# Patient Record
Sex: FEMALE | Race: WHITE | NOT HISPANIC OR LATINO | Employment: FULL TIME | URBAN - METROPOLITAN AREA
[De-identification: names, ages, dates, MRNs, and addresses within clinical notes are randomized per-mention and may not be internally consistent; named-entity substitution may affect disease eponyms.]

---

## 2018-04-27 ENCOUNTER — APPOINTMENT (OUTPATIENT)
Dept: WOMENS IMAGING | Facility: HOSPITAL | Age: 48
End: 2018-04-27

## 2018-04-27 PROCEDURE — 77066 DX MAMMO INCL CAD BI: CPT | Performed by: RADIOLOGY

## 2018-04-27 PROCEDURE — 77062 BREAST TOMOSYNTHESIS BI: CPT | Performed by: RADIOLOGY

## 2018-04-27 PROCEDURE — 76641 ULTRASOUND BREAST COMPLETE: CPT | Performed by: RADIOLOGY

## 2022-05-12 ENCOUNTER — OFFICE VISIT (OUTPATIENT)
Dept: CARDIOLOGY | Facility: CLINIC | Age: 52
End: 2022-05-12

## 2022-05-12 VITALS
WEIGHT: 293 LBS | HEART RATE: 84 BPM | HEIGHT: 69 IN | BODY MASS INDEX: 43.4 KG/M2 | DIASTOLIC BLOOD PRESSURE: 85 MMHG | SYSTOLIC BLOOD PRESSURE: 140 MMHG | OXYGEN SATURATION: 99 %

## 2022-05-12 DIAGNOSIS — R55 SYNCOPE AND COLLAPSE: Primary | ICD-10-CM

## 2022-05-12 DIAGNOSIS — R06.02 SOB (SHORTNESS OF BREATH): ICD-10-CM

## 2022-05-12 DIAGNOSIS — Z86.711 HISTORY OF PULMONARY EMBOLUS (PE): ICD-10-CM

## 2022-05-12 DIAGNOSIS — R07.89 CHEST PAIN, ATYPICAL: ICD-10-CM

## 2022-05-12 PROCEDURE — 93000 ELECTROCARDIOGRAM COMPLETE: CPT | Performed by: NURSE PRACTITIONER

## 2022-05-12 PROCEDURE — 99204 OFFICE O/P NEW MOD 45 MIN: CPT | Performed by: NURSE PRACTITIONER

## 2022-05-12 RX ORDER — MULTIPLE VITAMINS W/ MINERALS TAB 9MG-400MCG
1 TAB ORAL DAILY
COMMUNITY

## 2022-05-12 RX ORDER — NITROGLYCERIN 0.4 MG/1
TABLET SUBLINGUAL
Qty: 30 TABLET | Refills: 5 | Status: SHIPPED | OUTPATIENT
Start: 2022-05-12

## 2022-05-12 RX ORDER — ASCORBIC ACID 500 MG
500 TABLET ORAL DAILY
COMMUNITY

## 2022-05-12 RX ORDER — FERROUS SULFATE TAB EC 324 MG (65 MG FE EQUIVALENT) 324 (65 FE) MG
324 TABLET DELAYED RESPONSE ORAL
COMMUNITY

## 2022-05-12 RX ORDER — LEVOTHYROXINE SODIUM 0.07 MG/1
75 TABLET ORAL DAILY
COMMUNITY

## 2022-05-12 RX ORDER — LEVOTHYROXINE AND LIOTHYRONINE 9.5; 2.25 UG/1; UG/1
15 TABLET ORAL DAILY
COMMUNITY

## 2022-05-12 RX ORDER — ERGOCALCIFEROL 1.25 MG/1
50000 CAPSULE ORAL WEEKLY
COMMUNITY

## 2022-05-12 NOTE — PROGRESS NOTES
Subjective     Stephanie Clemens is a 52 y.o. female who presents to day for Palpitations, Shortness of Breath, and Pulmonary Embolism.    CHIEF COMPLIANT  Chief Complaint   Patient presents with   • Palpitations   • Shortness of Breath   • Pulmonary Embolism       Active Problems:  Problem List Items Addressed This Visit    None     Visit Diagnoses     Syncope and collapse    -  Primary    Relevant Orders    Adult Transthoracic Echo Complete W/ Cont if Necessary Per Protocol    Stress Test With Myocardial Perfusion One Day    Cardiac Event Monitor    Chest pain, atypical        Relevant Medications    nitroglycerin (NITROSTAT) 0.4 MG SL tablet    Other Relevant Orders    Adult Transthoracic Echo Complete W/ Cont if Necessary Per Protocol    Stress Test With Myocardial Perfusion One Day    Cardiac Event Monitor    SOB (shortness of breath)        Relevant Orders    Adult Transthoracic Echo Complete W/ Cont if Necessary Per Protocol    Stress Test With Myocardial Perfusion One Day    Cardiac Event Monitor    History of pulmonary embolus (PE)        Relevant Orders    Adult Transthoracic Echo Complete W/ Cont if Necessary Per Protocol    Stress Test With Myocardial Perfusion One Day    Cardiac Event Monitor        Problem list  1.  Chest pain  2.  Shortness of breath  3.  History of pulmonary embolism on Xarelto  4.  Syncope  5.  Fatigue  HPI  HPI  Ms. Clemens is a 52-year-old female patient who is being seen today to establish care for history of pulmonary embolism which was diagnosed 4/22/2022.  She is being treated for this by her PCP who is placed her on Xarelto 15 mg 2 times daily for 21 days then 20 mg daily thereafter.  She is waiting on referrals from hematology and pulmonology.  She did have bilateral medium size upper and lower lobe pulmonary emboli with the largest in the right lobe is identified in the right main pulmonary artery.  She is tolerating the Xarelto well.  She is not having any major signs or  "symptoms of bleeding.  She does report having heavier periods.  She does report that she does not feel like she gets a good breath and she has to take deeper breaths.  She does not have a history of clots but she does not identify any potential triggers or provoking factors for her to develop a blood clot.  She also reports chest pain that started around the same time in which she describes as occurring under her left breast into her left armpit which is a sharp shooting pain that short-lived.  She also has pain in her left chest that goes into her left neck and left side of her back that is a \"hurt \"type pain.  This can last up to 15 to 20 minutes per episode.  Occurs both with rest and activity.  She does experience some nausea with these episodes.  She denies any treatments at this time.  She also has shortness of breath in which some days are worse than others.  It is intermittent and varies.  She does get it with activity and at rest.  She says at times minimal activity can cause her to be dyspneic as well as just talking can cause her to become short of breath at other times.  She is not able to go more than 15 minutes with any activity without becoming severely dyspneic.  She also reports fatigue where she is very tired all the time and this has been happening for quite a while.  She is recently went under a home sleep study.  She is awaiting results.  She also reports that she has had syncope x4.  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  She has been temporarily removed from driving the bus.  She says that she did not feel like she was overly sleepy more than usual and that she did not remember falling asleep.  But she jolted back to consciousness.  She denied any convulsions or loss bowel or bladder control.  She had no COVID and she was evaluated for this as well.  She also has a relatively large thyroid nodule on the " right side and another palpable lesion just above that for palpitation.  She is being followed by endocrinology for this and has a repeat scan in the near future.  Her blood pressure is elevated today at 140/85 heart rate of 84.  She had it usually runs about 127/67 at home.  She is not had any trouble with her blood pressure here lately it has been running higher since the PE E.  She does have chronic lower extremity edema which mainly occurs around her ankles.  She also reports palpitations where she has intermittent racing type sensation in her chest with intermittent dizziness and lightheadedness.  PRIOR MEDS  Current Outpatient Medications on File Prior to Visit   Medication Sig Dispense Refill   • ferrous sulfate 324 (65 Fe) MG tablet delayed-release EC tablet Take 324 mg by mouth Daily With Breakfast.     • levothyroxine (SYNTHROID, LEVOTHROID) 75 MCG tablet Take 75 mcg by mouth Daily.     • multivitamin with minerals tablet tablet Take 1 tablet by mouth Daily.     • rivaroxaban (XARELTO) 15 MG tablet Take 15 mg by mouth Daily.     • thyroid (ARMOUR) 15 MG tablet Take 15 mg by mouth Daily.     • vitamin C (ASCORBIC ACID) 500 MG tablet Take 500 mg by mouth Daily.     • vitamin D (ERGOCALCIFEROL) 1.25 MG (06685 UT) capsule capsule Take 50,000 Units by mouth 1 (One) Time Per Week.       No current facility-administered medications on file prior to visit.       ALLERGIES  Patient has no known allergies.    HISTORY  Past Medical History:   Diagnosis Date   • Anemia, unspecified    • Hypothyroidism    • Pulmonary embolism (HCC)    • Right thyroid nodule        Social History     Socioeconomic History   • Marital status:    Tobacco Use   • Smoking status: Never Smoker   • Smokeless tobacco: Never Used   Substance and Sexual Activity   • Alcohol use: Never   • Drug use: Never   • Sexual activity: Defer       Family History   Problem Relation Age of Onset   • No Known Problems Mother    • Heart disease Father   "  • Diabetes Father    • Hypertension Sister    • Anemia Sister    • Breast cancer Maternal Grandmother    • Osteoporosis Maternal Grandmother    • Hip fracture Maternal Grandmother        Review of Systems   Constitutional: Positive for fatigue. Negative for chills and fever.   HENT: Positive for congestion and sore throat. Negative for rhinorrhea.    Respiratory: Positive for shortness of breath. Negative for chest tightness.    Cardiovascular: Positive for chest pain (Left arm Pain Neck Left side Back Left side), palpitations (races) and leg swelling.   Gastrointestinal: Negative for constipation, diarrhea and nausea.   Musculoskeletal: Positive for arthralgias, back pain (degenertivr disc disease) and neck pain.   Allergic/Immunologic: Positive for environmental allergies. Negative for food allergies.   Neurological: Positive for dizziness, syncope, weakness and light-headedness.   Hematological: Bruises/bleeds easily.   Psychiatric/Behavioral: Negative for sleep disturbance.       Objective     VITALS: /85 (BP Location: Left arm, Patient Position: Sitting)   Pulse 84   Ht 175.3 cm (69\")   Wt (!) 151 kg (332 lb)   SpO2 99%   BMI 49.03 kg/m²     LABS:   Lab Results (most recent)     None          IMAGING:   No Images in the past 120 days found..    EXAM:  Physical Exam  Vitals and nursing note reviewed.   Constitutional:       Appearance: She is well-developed.   HENT:      Head: Normocephalic.   Eyes:      Pupils: Pupils are equal, round, and reactive to light.   Neck:      Thyroid: No thyroid mass.      Vascular: No carotid bruit or JVD.      Trachea: Trachea and phonation normal.   Cardiovascular:      Rate and Rhythm: Normal rate and regular rhythm.      Pulses:           Radial pulses are 2+ on the right side and 2+ on the left side.        Posterior tibial pulses are 2+ on the right side and 2+ on the left side.      Heart sounds: Normal heart sounds. No murmur heard.    No friction rub. No " gallop.   Pulmonary:      Effort: Pulmonary effort is normal. No respiratory distress.      Breath sounds: Normal breath sounds. No wheezing or rales.   Abdominal:      General: Bowel sounds are normal.      Palpations: Abdomen is soft.   Musculoskeletal:         General: Swelling (1+ nonpitting) present. Normal range of motion.      Cervical back: Neck supple.   Skin:     General: Skin is warm and dry.      Capillary Refill: Capillary refill takes less than 2 seconds.      Findings: No rash.   Neurological:      Mental Status: She is alert and oriented to person, place, and time.   Psychiatric:         Speech: Speech normal.         Behavior: Behavior normal.         Thought Content: Thought content normal.         Judgment: Judgment normal.         Procedure     ECG 12 Lead    Date/Time: 5/12/2022 11:15 AM  Performed by: Herminio Barraza APRN  Authorized by: Herminio Barraza APRN   Rhythm: sinus rhythm  Rate: normal  BPM: 79  QRS axis: normal  Other findings: non-specific ST-T wave changes  Comments:  ms  No acute changes               Assessment & Plan    Diagnosis Plan   1. Syncope and collapse  Adult Transthoracic Echo Complete W/ Cont if Necessary Per Protocol    Stress Test With Myocardial Perfusion One Day    Cardiac Event Monitor   2. Chest pain, atypical  Adult Transthoracic Echo Complete W/ Cont if Necessary Per Protocol    Stress Test With Myocardial Perfusion One Day    Cardiac Event Monitor    nitroglycerin (NITROSTAT) 0.4 MG SL tablet   3. SOB (shortness of breath)  Adult Transthoracic Echo Complete W/ Cont if Necessary Per Protocol    Stress Test With Myocardial Perfusion One Day    Cardiac Event Monitor   4. History of pulmonary embolus (PE)  Adult Transthoracic Echo Complete W/ Cont if Necessary Per Protocol    Stress Test With Myocardial Perfusion One Day    Cardiac Event Monitor   1.  Due to patient's syncope, chest pain, shortness of breath I do feel its appropriate have patient undergo  an ischemic evaluation including stress test and echocardiogram.  This will help determine the potential causes of her symptoms as well as rule out ischemia as a potential factor.  2.  Patient was prescribed sublingual nitroglycerin and advised he can take up to 3 doses 5 minutes apart and if pain is not relieved after the third dose go to the emergency department.  3.  Due to patient's syncope I also like for her to wear a cardiac event monitor eptotermin etiology of her palpitations and potential causes of her syncope.  She will wear the cardiac event monitor for 14 days.  4.  Informed of signs and symptoms of ACS and advised to seek emergent treatment for any new worsening symptoms.  Patient also advised sooner follow-up as needed.  Also advised to follow-up with family doctor as needed  This note is dictated utilizing voice recognition software.  Although this record has been proof read, transcriptional errors may still be present. If questions occur regarding the content of this record please do not hesitate to call our office.  I have reviewed and confirmed the accuracy of the ROS as documented by the MA/LPN/RN MARLENI Morton    Return in about 3 months (around 8/12/2022), or if symptoms worsen or fail to improve.    Diagnoses and all orders for this visit:    1. Syncope and collapse (Primary)  -     Adult Transthoracic Echo Complete W/ Cont if Necessary Per Protocol; Future  -     Stress Test With Myocardial Perfusion One Day; Future  -     Cardiac Event Monitor; Future    2. Chest pain, atypical  -     Adult Transthoracic Echo Complete W/ Cont if Necessary Per Protocol; Future  -     Stress Test With Myocardial Perfusion One Day; Future  -     Cardiac Event Monitor; Future  -     nitroglycerin (NITROSTAT) 0.4 MG SL tablet; 1 under the tongue as needed for angina, may repeat q5mins for up three doses  Dispense: 30 tablet; Refill: 5    3. SOB (shortness of breath)  -     Adult Transthoracic Echo Complete  W/ Cont if Necessary Per Protocol; Future  -     Stress Test With Myocardial Perfusion One Day; Future  -     Cardiac Event Monitor; Future    4. History of pulmonary embolus (PE)  -     Adult Transthoracic Echo Complete W/ Cont if Necessary Per Protocol; Future  -     Stress Test With Myocardial Perfusion One Day; Future  -     Cardiac Event Monitor; Future        Stephanie DIAS Jayleen  reports that she has never smoked. She has never used smokeless tobacco.. I have educated her on the risk of diseases from using tobacco products .         MEDS ORDERED DURING VISIT:  New Medications Ordered This Visit   Medications   • nitroglycerin (NITROSTAT) 0.4 MG SL tablet     Si under the tongue as needed for angina, may repeat q5mins for up three doses     Dispense:  30 tablet     Refill:  5           This document has been electronically signed by Herminio Barraza Jr., APRN  May 12, 2022 11:13 EDT

## 2022-06-28 ENCOUNTER — HOSPITAL ENCOUNTER (OUTPATIENT)
Dept: CARDIOLOGY | Facility: HOSPITAL | Age: 52
Discharge: HOME | End: 2022-06-28
Admitting: NURSE PRACTITIONER

## 2022-06-28 ENCOUNTER — HOSPITAL ENCOUNTER (OUTPATIENT)
Dept: CARDIOLOGY | Facility: HOSPITAL | Age: 52
Discharge: HOME | End: 2022-06-28

## 2022-06-28 VITALS — WEIGHT: 293 LBS | HEIGHT: 69 IN | BODY MASS INDEX: 43.4 KG/M2

## 2022-06-28 DIAGNOSIS — Z86.711 HISTORY OF PULMONARY EMBOLUS (PE): ICD-10-CM

## 2022-06-28 DIAGNOSIS — R55 SYNCOPE AND COLLAPSE: ICD-10-CM

## 2022-06-28 DIAGNOSIS — R06.02 SOB (SHORTNESS OF BREATH): ICD-10-CM

## 2022-06-28 DIAGNOSIS — R07.89 CHEST PAIN, ATYPICAL: ICD-10-CM

## 2022-06-28 PROCEDURE — 0 TECHNETIUM SESTAMIBI: Performed by: INTERNAL MEDICINE

## 2022-06-28 PROCEDURE — 25010000002 REGADENOSON 0.4 MG/5ML SOLUTION: Performed by: INTERNAL MEDICINE

## 2022-06-28 PROCEDURE — A9500 TC99M SESTAMIBI: HCPCS | Performed by: INTERNAL MEDICINE

## 2022-06-28 PROCEDURE — 93017 CV STRESS TEST TRACING ONLY: CPT

## 2022-06-28 PROCEDURE — 93306 TTE W/DOPPLER COMPLETE: CPT

## 2022-06-28 PROCEDURE — 93306 TTE W/DOPPLER COMPLETE: CPT | Performed by: INTERNAL MEDICINE

## 2022-06-28 PROCEDURE — 78452 HT MUSCLE IMAGE SPECT MULT: CPT

## 2022-06-28 PROCEDURE — 93018 CV STRESS TEST I&R ONLY: CPT | Performed by: INTERNAL MEDICINE

## 2022-06-28 PROCEDURE — 78452 HT MUSCLE IMAGE SPECT MULT: CPT | Performed by: INTERNAL MEDICINE

## 2022-06-28 RX ADMIN — TECHNETIUM TC 99M SESTAMIBI 1 DOSE: 1 INJECTION INTRAVENOUS at 10:06

## 2022-06-28 RX ADMIN — TECHNETIUM TC 99M SESTAMIBI 1 DOSE: 1 INJECTION INTRAVENOUS at 08:27

## 2022-06-28 RX ADMIN — REGADENOSON 0.4 MG: 0.08 INJECTION, SOLUTION INTRAVENOUS at 10:06

## 2022-06-30 LAB
BH CV REST NUCLEAR ISOTOPE DOSE: 10 MCI
BH CV STRESS COMMENTS STAGE 1: NORMAL
BH CV STRESS DOSE REGADENOSON STAGE 1: 0.4
BH CV STRESS DURATION MIN STAGE 1: 0
BH CV STRESS DURATION SEC STAGE 1: 10
BH CV STRESS NUCLEAR ISOTOPE DOSE: 30 MCI
BH CV STRESS PROTOCOL 1: NORMAL
BH CV STRESS RECOVERY BP: NORMAL MMHG
BH CV STRESS RECOVERY HR: 75 BPM
BH CV STRESS STAGE 1: 1
MAXIMAL PREDICTED HEART RATE: 167 BPM
PERCENT MAX PREDICTED HR: 54.49 %
STRESS BASELINE BP: NORMAL MMHG
STRESS BASELINE HR: 67 BPM
STRESS PERCENT HR: 64 %
STRESS POST PEAK BP: NORMAL MMHG
STRESS POST PEAK HR: 91 BPM
STRESS TARGET HR: 142 BPM

## 2022-07-03 LAB
AORTIC DIMENSIONLESS INDEX: 1 (DI)
BH CV ECHO MEAS - ACS: 2 CM
BH CV ECHO MEAS - AO MAX PG: 3.8 MMHG
BH CV ECHO MEAS - AO MEAN PG: 2.2 MMHG
BH CV ECHO MEAS - AO ROOT DIAM: 3.3 CM
BH CV ECHO MEAS - AO V2 MAX: 97.6 CM/SEC
BH CV ECHO MEAS - AO V2 VTI: 23.9 CM
BH CV ECHO MEAS - EDV(CUBED): 178.4 ML
BH CV ECHO MEAS - EF_3D-VOL: 61 %
BH CV ECHO MEAS - ESV(CUBED): 70.8 ML
BH CV ECHO MEAS - FS: 26.5 %
BH CV ECHO MEAS - IVS/LVPW: 1.02 CM
BH CV ECHO MEAS - IVSD: 1.17 CM
BH CV ECHO MEAS - LA DIMENSION: 4.6 CM
BH CV ECHO MEAS - LAT PEAK E' VEL: 9.1 CM/SEC
BH CV ECHO MEAS - LV MASS(C)D: 270.1 GRAMS
BH CV ECHO MEAS - LV MAX PG: 4.1 MMHG
BH CV ECHO MEAS - LV MEAN PG: 1.71 MMHG
BH CV ECHO MEAS - LV V1 MAX: 100.9 CM/SEC
BH CV ECHO MEAS - LV V1 VTI: 25.4 CM
BH CV ECHO MEAS - LVIDD: 5.6 CM
BH CV ECHO MEAS - LVIDS: 4.1 CM
BH CV ECHO MEAS - LVPWD: 1.15 CM
BH CV ECHO MEAS - MED PEAK E' VEL: 7.2 CM/SEC
BH CV ECHO MEAS - MV A MAX VEL: 86.9 CM/SEC
BH CV ECHO MEAS - MV DEC SLOPE: 511.2 CM/SEC2
BH CV ECHO MEAS - MV DEC TIME: 0.2 MSEC
BH CV ECHO MEAS - MV E MAX VEL: 80.2 CM/SEC
BH CV ECHO MEAS - MV E/A: 0.92
BH CV ECHO MEAS - MV MAX PG: 3.3 MMHG
BH CV ECHO MEAS - MV MEAN PG: 1.47 MMHG
BH CV ECHO MEAS - MV P1/2T: 52.6 MSEC
BH CV ECHO MEAS - MV V2 VTI: 27.7 CM
BH CV ECHO MEAS - MVA(P1/2T): 4.2 CM2
BH CV ECHO MEAS - PA V2 MAX: 87.9 CM/SEC
BH CV ECHO MEAS - RV MAX PG: 2.6 MMHG
BH CV ECHO MEAS - RV V1 MAX: 80.2 CM/SEC
BH CV ECHO MEAS - RV V1 VTI: 25.3 CM
BH CV ECHO MEAS - RVDD: 2.8 CM
BH CV ECHO MEAS - TAPSE (>1.6): 2.37 CM
BH CV ECHO MEASUREMENTS AVERAGE E/E' RATIO: 9.84
BH CV XLRA - TDI S': 12.8 CM/SEC
IVRT: 121 MSEC
MAXIMAL PREDICTED HEART RATE: 167 BPM
SINUS: 3.3 CM
STJ: 2.7 CM
STRESS TARGET HR: 142 BPM

## 2022-07-05 ENCOUNTER — TELEPHONE (OUTPATIENT)
Dept: CARDIOLOGY | Facility: CLINIC | Age: 52
End: 2022-07-05

## 2022-07-05 NOTE — TELEPHONE ENCOUNTER
STRESS/ECHO  Pt notified of no acute findings. Provider will discuss results at f/u. Pt reminded of appt date and time.  ----- Message from Naomi Burdick MA sent at 7/1/2022 11:32 AM EDT -----  Regarding: FW:    ----- Message -----  From: Herminio Barraza APRN  Sent: 7/1/2022   8:19 AM EDT  To: Naomi Burdick MA  Subject: FW:                                              Normal stress test keep follow up  ----- Message -----  From: Arpit Orr MD  Sent: 6/30/2022  10:11 PM EDT  To: MARLENI Morton

## 2024-03-07 ENCOUNTER — TELEPHONE (OUTPATIENT)
Dept: CARDIOLOGY | Facility: CLINIC | Age: 54
End: 2024-03-07
Payer: MEDICAID

## 2024-03-07 DIAGNOSIS — R00.2 PALPITATIONS: Primary | ICD-10-CM

## 2024-03-07 NOTE — TELEPHONE ENCOUNTER
Caller: Stephanie Clemens    Relationship to patient: Self    Best call back number: [phone]    Chief complaint: PT SAW HER PCP AND PCP STATED SHE NEEDED TO GET IN TO BE SEEN AND HAVE A MONITOR PLACED ON HER. PT HAS BEEN HAVING LIGHTHEADEDNESS AND DIZZINESS, BP IS READING HIGH TO LOW AND AN ABNORMAL HEART RATE FOR HER BP MACHINE.     Type of visit: FOLLOW UP    Requested date: NEEDS TO BE IN APRIL, HAVING PROCEDURE PRIOR TO THAT.     Additional notes: WAS LAST SEEN IN THE MIDDLE OF 2022 AND IS REQUESTING TO SEE LAUREANO EVEN THOUGH SHE HAS NOT SEEN HIM PREVIOUSLY.

## 2024-03-07 NOTE — TELEPHONE ENCOUNTER
Per Isaac Cunha pac 72 hour holter monitor, order placed and patient notified. Patient reports is having hysterectomy next Thursday, instructed patient to come in early in the morning for monitor placement and place monitor in mail as soon as she takes it off so we can get result's back prior to her surgery. Patient verbalizes understanding. Order placed.

## 2024-03-13 ENCOUNTER — OFFICE VISIT (OUTPATIENT)
Dept: CARDIOLOGY | Facility: CLINIC | Age: 54
End: 2024-03-13
Payer: MEDICAID

## 2024-03-13 VITALS
HEART RATE: 70 BPM | SYSTOLIC BLOOD PRESSURE: 139 MMHG | OXYGEN SATURATION: 96 % | BODY MASS INDEX: 44.41 KG/M2 | HEIGHT: 68 IN | WEIGHT: 293 LBS | DIASTOLIC BLOOD PRESSURE: 81 MMHG

## 2024-03-13 DIAGNOSIS — I10 PRIMARY HYPERTENSION: ICD-10-CM

## 2024-03-13 DIAGNOSIS — R07.2 PRECORDIAL PAIN: ICD-10-CM

## 2024-03-13 DIAGNOSIS — R06.02 SOB (SHORTNESS OF BREATH): Primary | ICD-10-CM

## 2024-03-13 PROCEDURE — 1160F RVW MEDS BY RX/DR IN RCRD: CPT | Performed by: PHYSICIAN ASSISTANT

## 2024-03-13 PROCEDURE — 93000 ELECTROCARDIOGRAM COMPLETE: CPT | Performed by: PHYSICIAN ASSISTANT

## 2024-03-13 PROCEDURE — 1159F MED LIST DOCD IN RCRD: CPT | Performed by: PHYSICIAN ASSISTANT

## 2024-03-13 PROCEDURE — 99214 OFFICE O/P EST MOD 30 MIN: CPT | Performed by: PHYSICIAN ASSISTANT

## 2024-03-13 RX ORDER — BUSPIRONE HYDROCHLORIDE 5 MG/1
5 TABLET ORAL 3 TIMES DAILY
COMMUNITY

## 2024-03-13 RX ORDER — EPINEPHRINE 0.3 MG/.3ML
0.3 INJECTION SUBCUTANEOUS ONCE
COMMUNITY

## 2024-03-13 RX ORDER — HYDROCHLOROTHIAZIDE 12.5 MG/1
12.5 TABLET ORAL DAILY
COMMUNITY

## 2024-03-13 RX ORDER — ROSUVASTATIN CALCIUM 5 MG/1
5 TABLET, COATED ORAL DAILY
COMMUNITY

## 2024-03-13 RX ORDER — IBUPROFEN 800 MG/1
800 TABLET ORAL EVERY 8 HOURS PRN
COMMUNITY
Start: 2023-10-02

## 2024-03-13 RX ORDER — HYDROXYZINE HYDROCHLORIDE 25 MG/1
25 TABLET, FILM COATED ORAL EVERY 6 HOURS PRN
COMMUNITY
Start: 2024-02-22

## 2024-03-13 RX ORDER — MONTELUKAST SODIUM 10 MG/1
10 TABLET ORAL NIGHTLY
COMMUNITY

## 2024-03-13 RX ORDER — LEVOTHYROXINE SODIUM 0.1 MG/1
100 TABLET ORAL DAILY
COMMUNITY

## 2024-03-13 NOTE — PROGRESS NOTES
Problem list     Subjective   Stephanie Clemens is a 53 y.o. female     Chief Complaint   Patient presents with    Follow-up     Cardiac clearance patient scheduled for hysterectomy 3/14/24 with Dr. Velazquez.     Chest Pain    Shortness of Breath     Patient reports shortness of breath states does not know if it is related to panic attacks or not, but does have panic attacks often.       HPI  The patient presents in the clinic today for preoperative cardiac clearance.  She was last seen almost 2 years ago following recurrent symptoms related to pulmonary embolism.  She had a pulmonary embolism diagnosed and was treated with appropriate protocol of Xarelto.  She had done well up until a few months ago.  She started having some degree of smothering.  She eventually was noted to be iron deficient anemic.  She was treated accordingly and her symptoms then resolved.  She had been seen for recurrent issues with her gynecology team.  Hysterectomy was planned for tomorrow.  She saw her primary care provider recently and reported recurrence of shortness of air and even now episodes of chest tightness.  Because of concern of family history, she was referred back to the clinic today urgently for consideration of cardiac clearance.  The patient is very uncomfortable with her symptoms.  When she tries to exert, she becomes dyspneic.  She develops chest aching and tightness.  Symptoms do limit activity.  She still feels it could be a component of anxiety and even possibly mild anemia contributing to symptoms.  She is very concerned and expresses a significant fear of cardiac complication during anesthesia.  She would like to pursue noninvasive cardiac testing prior to any type of surgical procedures.  She presents today to discuss this.    Current Outpatient Medications on File Prior to Visit   Medication Sig Dispense Refill    busPIRone (BUSPAR) 5 MG tablet Take 1 tablet by mouth 3 (Three) Times a Day.      EPINEPHrine (EPIPEN)  0.3 MG/0.3ML solution auto-injector injection Inject 0.3 mL under the skin into the appropriate area as directed 1 (One) Time.      hydroCHLOROthiazide 12.5 MG tablet Take 1 tablet by mouth Daily.      hydrOXYzine (ATARAX) 25 MG tablet Take 1 tablet by mouth Every 6 (Six) Hours As Needed.      ibuprofen (ADVIL,MOTRIN) 800 MG tablet Take 1 tablet by mouth Every 8 (Eight) Hours As Needed.      levothyroxine (SYNTHROID, LEVOTHROID) 100 MCG tablet Take 1 tablet by mouth Daily.      montelukast (SINGULAIR) 10 MG tablet Take 1 tablet by mouth Every Night.      nitroglycerin (NITROSTAT) 0.4 MG SL tablet 1 under the tongue as needed for angina, may repeat q5mins for up three doses 30 tablet 5    rosuvastatin (CRESTOR) 5 MG tablet Take 1 tablet by mouth Daily.      sertraline (ZOLOFT) 50 MG tablet Take 1 tablet by mouth Daily.      vitamin D (ERGOCALCIFEROL) 1.25 MG (45124 UT) capsule capsule Take 1 capsule by mouth 1 (One) Time Per Week.      [DISCONTINUED] ferrous sulfate 324 (65 Fe) MG tablet delayed-release EC tablet Take 324 mg by mouth Daily With Breakfast.      [DISCONTINUED] levothyroxine (SYNTHROID, LEVOTHROID) 75 MCG tablet Take 75 mcg by mouth Daily.      [DISCONTINUED] multivitamin with minerals tablet tablet Take 1 tablet by mouth Daily.      [DISCONTINUED] rivaroxaban (XARELTO) 15 MG tablet Take 15 mg by mouth Daily.      [DISCONTINUED] thyroid (ARMOUR) 15 MG tablet Take 15 mg by mouth Daily.      [DISCONTINUED] vitamin C (ASCORBIC ACID) 500 MG tablet Take 500 mg by mouth Daily.       No current facility-administered medications on file prior to visit.       Bee venom    Past Medical History:   Diagnosis Date    Anemia, unspecified     Hypothyroidism     Pulmonary embolism     Right thyroid nodule        Social History     Socioeconomic History    Marital status:    Tobacco Use    Smoking status: Never    Smokeless tobacco: Never   Substance and Sexual Activity    Alcohol use: Never    Drug use: Never     "Sexual activity: Defer       Family History   Problem Relation Age of Onset    No Known Problems Mother     Heart disease Father     Diabetes Father     Hypertension Sister     Anemia Sister     Breast cancer Maternal Grandmother     Osteoporosis Maternal Grandmother     Hip fracture Maternal Grandmother        Review of Systems   Constitutional:  Positive for chills and fatigue. Negative for diaphoresis and fever.   HENT: Negative.     Eyes: Negative.  Negative for visual disturbance.   Respiratory:  Positive for apnea (does not wear c-pap) and shortness of breath. Negative for cough, chest tightness and wheezing.    Cardiovascular:  Positive for chest pain and leg swelling (feet). Negative for palpitations.   Gastrointestinal: Negative.  Negative for abdominal pain and blood in stool.   Endocrine: Negative.    Genitourinary: Negative.  Negative for hematuria.   Musculoskeletal:  Positive for back pain. Negative for arthralgias, myalgias, neck pain and neck stiffness.   Skin: Negative.  Negative for rash and wound.   Allergic/Immunologic: Positive for environmental allergies (seasonal). Negative for food allergies.   Neurological:  Positive for weakness, light-headedness and numbness (hands and feet). Negative for dizziness, syncope and headaches.   Hematological:  Bruises/bleeds easily (bruises easily).   Psychiatric/Behavioral:  Positive for sleep disturbance (sleep apnea).        Objective   Vitals:    03/13/24 0913   BP: 139/81   Pulse: 70   SpO2: 96%   Weight: (!) 140 kg (308 lb)   Height: 172.7 cm (68\")      /81   Pulse 70   Ht 172.7 cm (68\")   Wt (!) 140 kg (308 lb)   SpO2 96%   BMI 46.83 kg/m²    Lab Results (most recent)       None          Physical Exam  Vitals and nursing note reviewed.   Constitutional:       General: She is not in acute distress.     Appearance: She is well-developed.   HENT:      Head: Normocephalic and atraumatic.   Eyes:      Conjunctiva/sclera: Conjunctivae normal.      " Pupils: Pupils are equal, round, and reactive to light.   Neck:      Vascular: No JVD.      Trachea: No tracheal deviation.   Cardiovascular:      Rate and Rhythm: Normal rate and regular rhythm.      Heart sounds: Normal heart sounds.   Pulmonary:      Effort: Pulmonary effort is normal.      Breath sounds: Normal breath sounds.   Abdominal:      General: Bowel sounds are normal. There is no distension.      Palpations: Abdomen is soft. There is no mass.      Tenderness: There is no abdominal tenderness. There is no guarding or rebound.   Musculoskeletal:         General: No tenderness or deformity. Normal range of motion.      Cervical back: Normal range of motion and neck supple.   Skin:     General: Skin is warm and dry.      Coloration: Skin is not pale.      Findings: No erythema or rash.   Neurological:      Mental Status: She is alert and oriented to person, place, and time.   Psychiatric:         Behavior: Behavior normal.         Thought Content: Thought content normal.         Judgment: Judgment normal.           Procedure     ECG 12 Lead    Date/Time: 3/13/2024 9:18 AM  Performed by: Isaac Cunha PA    Authorized by: Isaac Cunha PA  Comparison: compared with previous ECG from 5/12/2022             Assessment & Plan      Diagnosis Plan   1. SOB (shortness of breath)  Adult Transthoracic Echo Complete W/ Cont if Necessary Per Protocol    Stress Test With Myocardial Perfusion One Day      2. Precordial pain  Adult Transthoracic Echo Complete W/ Cont if Necessary Per Protocol    Stress Test With Myocardial Perfusion One Day      3. Primary hypertension  Adult Transthoracic Echo Complete W/ Cont if Necessary Per Protocol    Stress Test With Myocardial Perfusion One Day        1.  The patient presents in for preoperative cardiac clearance.  She is very concerned dyspnea and chest discomfort as of recent.  She would like to pursue cardiac evaluation prior to anesthesia and surgical procedures.    2.   I would schedule for nuclear stress test for ischemia assessment.  She cannot tolerate treadmill protocol and will be scheduled for Lexiscan protocol.    3.  I would also schedule for an echo.  We can evaluate LV size and function, valvular morphologies, and cardiac structure otherwise.    4.  For now we will continue medications without change.    5.  We have asked that the stress and the echo studies to be performed soon as possible as the patient is pending surgical procedures.  We have asked that her procedure tomorrow for hysterectomy be delayed until we can perform testing.                 Electronically signed by:

## 2024-03-14 ENCOUNTER — TELEPHONE (OUTPATIENT)
Dept: CARDIOLOGY | Facility: CLINIC | Age: 54
End: 2024-03-14
Payer: MEDICAID

## 2024-03-14 NOTE — TELEPHONE ENCOUNTER
Received cardiac clearance request from Dr. Raiza Velazquez stating pt has robotic TLH/BSO scheduled for 03/14/2024 and is requiring a cardiac clearance. Placed cardiac clearance request in Chiara' inbox to review and address with provider.

## 2024-03-29 ENCOUNTER — HOSPITAL ENCOUNTER (OUTPATIENT)
Dept: CARDIOLOGY | Facility: HOSPITAL | Age: 54
Discharge: HOME | End: 2024-03-29
Payer: MEDICAID

## 2024-03-29 VITALS — BODY MASS INDEX: 44.41 KG/M2 | WEIGHT: 293 LBS | HEIGHT: 68 IN

## 2024-03-29 DIAGNOSIS — R07.2 PRECORDIAL PAIN: ICD-10-CM

## 2024-03-29 DIAGNOSIS — R06.02 SOB (SHORTNESS OF BREATH): ICD-10-CM

## 2024-03-29 DIAGNOSIS — I10 PRIMARY HYPERTENSION: ICD-10-CM

## 2024-03-29 LAB
AORTIC DIMENSIONLESS INDEX: 1 (DI)
BH CV ECHO MEAS - AO MAX PG: 3.6 MMHG
BH CV ECHO MEAS - AO MEAN PG: 2.23 MMHG
BH CV ECHO MEAS - AO ROOT DIAM: 2.8 CM
BH CV ECHO MEAS - AO V2 MAX: 94.7 CM/SEC
BH CV ECHO MEAS - AO V2 VTI: 23.3 CM
BH CV ECHO MEAS - EDV(CUBED): 107.1 ML
BH CV ECHO MEAS - EF_3D-VOL: 60 %
BH CV ECHO MEAS - ESV(CUBED): 35.9 ML
BH CV ECHO MEAS - FS: 30.5 %
BH CV ECHO MEAS - IVS/LVPW: 0.96 CM
BH CV ECHO MEAS - IVSD: 1.08 CM
BH CV ECHO MEAS - LA DIMENSION: 3.9 CM
BH CV ECHO MEAS - LAT PEAK E' VEL: 8.4 CM/SEC
BH CV ECHO MEAS - LV MASS(C)D: 190.6 GRAMS
BH CV ECHO MEAS - LV MAX PG: 3.9 MMHG
BH CV ECHO MEAS - LV MEAN PG: 1.68 MMHG
BH CV ECHO MEAS - LV V1 MAX: 98.8 CM/SEC
BH CV ECHO MEAS - LV V1 VTI: 23.9 CM
BH CV ECHO MEAS - LVIDD: 4.7 CM
BH CV ECHO MEAS - LVIDS: 3.3 CM
BH CV ECHO MEAS - LVPWD: 1.12 CM
BH CV ECHO MEAS - MED PEAK E' VEL: 5.9 CM/SEC
BH CV ECHO MEAS - MV A MAX VEL: 65.3 CM/SEC
BH CV ECHO MEAS - MV DEC SLOPE: 509.1 CM/SEC2
BH CV ECHO MEAS - MV DEC TIME: 0.25 SEC
BH CV ECHO MEAS - MV E MAX VEL: 83.2 CM/SEC
BH CV ECHO MEAS - MV E/A: 1.28
BH CV ECHO MEAS - MV MAX PG: 3.6 MMHG
BH CV ECHO MEAS - MV MEAN PG: 1.33 MMHG
BH CV ECHO MEAS - MV P1/2T: 55 MSEC
BH CV ECHO MEAS - MV V2 VTI: 23.3 CM
BH CV ECHO MEAS - MVA(P1/2T): 4 CM2
BH CV ECHO MEAS - PA V2 MAX: 83.1 CM/SEC
BH CV ECHO MEAS - RV MAX PG: 2.34 MMHG
BH CV ECHO MEAS - RV V1 MAX: 76.5 CM/SEC
BH CV ECHO MEAS - RV V1 VTI: 16.8 CM
BH CV ECHO MEAS - TAPSE (>1.6): 2.9 CM
BH CV ECHO MEASUREMENTS AVERAGE E/E' RATIO: 11.64
BH CV REST NUCLEAR ISOTOPE DOSE: 10 MCI
BH CV STRESS COMMENTS STAGE 1: NORMAL
BH CV STRESS DOSE REGADENOSON STAGE 1: 0.4
BH CV STRESS DURATION MIN STAGE 1: 0
BH CV STRESS DURATION SEC STAGE 1: 10
BH CV STRESS NUCLEAR ISOTOPE DOSE: 30 MCI
BH CV STRESS PROTOCOL 1: NORMAL
BH CV STRESS RECOVERY BP: NORMAL MMHG
BH CV STRESS RECOVERY HR: 72 BPM
BH CV STRESS STAGE 1: 1
BH CV XLRA - TDI S': 12 CM/SEC
MAXIMAL PREDICTED HEART RATE: 166 BPM
PERCENT MAX PREDICTED HR: 54.22 %
SINUS: 3.2 CM
STRESS BASELINE BP: NORMAL MMHG
STRESS BASELINE HR: 66 BPM
STRESS PERCENT HR: 64 %
STRESS POST PEAK BP: NORMAL MMHG
STRESS POST PEAK HR: 90 BPM
STRESS TARGET HR: 141 BPM

## 2024-03-29 PROCEDURE — 25010000002 REGADENOSON 0.4 MG/5ML SOLUTION: Performed by: INTERNAL MEDICINE

## 2024-03-29 PROCEDURE — A9500 TC99M SESTAMIBI: HCPCS | Performed by: INTERNAL MEDICINE

## 2024-03-29 PROCEDURE — 93017 CV STRESS TEST TRACING ONLY: CPT

## 2024-03-29 PROCEDURE — 0 TECHNETIUM SESTAMIBI: Performed by: INTERNAL MEDICINE

## 2024-03-29 PROCEDURE — 78452 HT MUSCLE IMAGE SPECT MULT: CPT

## 2024-03-29 PROCEDURE — 93306 TTE W/DOPPLER COMPLETE: CPT

## 2024-03-29 RX ORDER — REGADENOSON 0.08 MG/ML
0.4 INJECTION, SOLUTION INTRAVENOUS
Status: COMPLETED | OUTPATIENT
Start: 2024-03-29 | End: 2024-03-29

## 2024-03-29 RX ADMIN — TECHNETIUM TC 99M SESTAMIBI 1 DOSE: 1 INJECTION INTRAVENOUS at 10:32

## 2024-03-29 RX ADMIN — TECHNETIUM TC 99M SESTAMIBI 1 DOSE: 1 INJECTION INTRAVENOUS at 08:30

## 2024-03-29 RX ADMIN — REGADENOSON 0.4 MG: 0.08 INJECTION, SOLUTION INTRAVENOUS at 10:32

## 2024-04-03 LAB
BH CV REST NUCLEAR ISOTOPE DOSE: 10 MCI
BH CV STRESS COMMENTS STAGE 1: NORMAL
BH CV STRESS DOSE REGADENOSON STAGE 1: 0.4
BH CV STRESS DURATION MIN STAGE 1: 0
BH CV STRESS DURATION SEC STAGE 1: 10
BH CV STRESS NUCLEAR ISOTOPE DOSE: 30 MCI
BH CV STRESS PROTOCOL 1: NORMAL
BH CV STRESS RECOVERY BP: NORMAL MMHG
BH CV STRESS RECOVERY HR: 72 BPM
BH CV STRESS STAGE 1: 1
MAXIMAL PREDICTED HEART RATE: 166 BPM
PERCENT MAX PREDICTED HR: 54.22 %
STRESS BASELINE BP: NORMAL MMHG
STRESS BASELINE HR: 66 BPM
STRESS PERCENT HR: 64 %
STRESS POST PEAK BP: NORMAL MMHG
STRESS POST PEAK HR: 90 BPM
STRESS TARGET HR: 141 BPM

## 2024-04-04 ENCOUNTER — TELEPHONE (OUTPATIENT)
Dept: CARDIOLOGY | Facility: CLINIC | Age: 54
End: 2024-04-04
Payer: MEDICAID

## 2024-04-04 NOTE — TELEPHONE ENCOUNTER
Patient notified of result's and recommendation's for 1 to 2 week follow up. She is aware that some one from our office will contact her with appt date and time.

## 2024-04-04 NOTE — TELEPHONE ENCOUNTER
----- Message from AMADA Villareal sent at 4/4/2024  2:31 PM EDT -----  Follow-up 1 to 2 weeks.  ----- Message -----  From: Arpit Orr MD  Sent: 4/3/2024   9:30 PM EDT  To: AMADA Villareal    Stress Test With Myocardial Perfusion One Day  Order: [id number]  Status: Final result       Visible to patient: Yes (seen)       Dx: Precordial pain; Primary hypertension...    0 Result Notes  Details    Reading Physician Reading Date Result Priority   Arpit Orr MD  [phone] 4/3/2024 Routine     Result Text  1.  Scintigraphy demonstrates a moderately sized, moderately dense nearly completely reversible inferolateral wall defect compatible with ischemia.  Additionally, there is a small to moderately sized, moderately dense reversible defect involving the apical and inferoapical segments also felt compatible with ischemia.     2.  Preserved post stress ejection fraction of 63% with no focal wall motion abnormalities.     3.  Normal transient ischemic dilation and lung heart radiopharmaceutical ratios of 1.03 and 0.05 argue against multivessel coronary disease and increased LV filling pressures respectively.

## 2024-04-07 LAB
AORTIC DIMENSIONLESS INDEX: 1 (DI)
BH CV ECHO MEAS - AO MAX PG: 3.6 MMHG
BH CV ECHO MEAS - AO MEAN PG: 2.23 MMHG
BH CV ECHO MEAS - AO ROOT DIAM: 2.8 CM
BH CV ECHO MEAS - AO V2 MAX: 94.7 CM/SEC
BH CV ECHO MEAS - AO V2 VTI: 23.3 CM
BH CV ECHO MEAS - EDV(CUBED): 107.1 ML
BH CV ECHO MEAS - EF_3D-VOL: 60 %
BH CV ECHO MEAS - ESV(CUBED): 35.9 ML
BH CV ECHO MEAS - FS: 30.5 %
BH CV ECHO MEAS - IVS/LVPW: 0.96 CM
BH CV ECHO MEAS - IVSD: 1.08 CM
BH CV ECHO MEAS - LA DIMENSION: 3.9 CM
BH CV ECHO MEAS - LAT PEAK E' VEL: 8.4 CM/SEC
BH CV ECHO MEAS - LV MASS(C)D: 190.6 GRAMS
BH CV ECHO MEAS - LV MAX PG: 3.9 MMHG
BH CV ECHO MEAS - LV MEAN PG: 1.68 MMHG
BH CV ECHO MEAS - LV V1 MAX: 98.8 CM/SEC
BH CV ECHO MEAS - LV V1 VTI: 23.9 CM
BH CV ECHO MEAS - LVIDD: 4.7 CM
BH CV ECHO MEAS - LVIDS: 3.3 CM
BH CV ECHO MEAS - LVPWD: 1.12 CM
BH CV ECHO MEAS - MED PEAK E' VEL: 5.9 CM/SEC
BH CV ECHO MEAS - MV A MAX VEL: 65.3 CM/SEC
BH CV ECHO MEAS - MV DEC SLOPE: 509.1 CM/SEC2
BH CV ECHO MEAS - MV DEC TIME: 0.25 SEC
BH CV ECHO MEAS - MV E MAX VEL: 83.2 CM/SEC
BH CV ECHO MEAS - MV E/A: 1.28
BH CV ECHO MEAS - MV MAX PG: 3.6 MMHG
BH CV ECHO MEAS - MV MEAN PG: 1.33 MMHG
BH CV ECHO MEAS - MV P1/2T: 55 MSEC
BH CV ECHO MEAS - MV V2 VTI: 23.3 CM
BH CV ECHO MEAS - MVA(P1/2T): 4 CM2
BH CV ECHO MEAS - PA V2 MAX: 83.1 CM/SEC
BH CV ECHO MEAS - RV MAX PG: 2.34 MMHG
BH CV ECHO MEAS - RV V1 MAX: 76.5 CM/SEC
BH CV ECHO MEAS - RV V1 VTI: 16.8 CM
BH CV ECHO MEAS - TAPSE (>1.6): 2.9 CM
BH CV ECHO MEASUREMENTS AVERAGE E/E' RATIO: 11.64
BH CV XLRA - TDI S': 12 CM/SEC
SINUS: 3.2 CM

## 2024-04-16 ENCOUNTER — TELEPHONE (OUTPATIENT)
Dept: CARDIOLOGY | Facility: CLINIC | Age: 54
End: 2024-04-16
Payer: MEDICAID

## 2024-04-16 ENCOUNTER — OFFICE VISIT (OUTPATIENT)
Dept: CARDIOLOGY | Facility: CLINIC | Age: 54
End: 2024-04-16
Payer: MEDICAID

## 2024-04-16 VITALS
DIASTOLIC BLOOD PRESSURE: 73 MMHG | SYSTOLIC BLOOD PRESSURE: 132 MMHG | BODY MASS INDEX: 43.4 KG/M2 | WEIGHT: 293 LBS | OXYGEN SATURATION: 95 % | HEART RATE: 82 BPM | HEIGHT: 69 IN

## 2024-04-16 DIAGNOSIS — R94.39 ABNORMAL NUCLEAR STRESS TEST: ICD-10-CM

## 2024-04-16 DIAGNOSIS — R07.2 PRECORDIAL PAIN: ICD-10-CM

## 2024-04-16 DIAGNOSIS — R06.02 SOB (SHORTNESS OF BREATH): Primary | ICD-10-CM

## 2024-04-16 PROCEDURE — 99214 OFFICE O/P EST MOD 30 MIN: CPT | Performed by: PHYSICIAN ASSISTANT

## 2024-04-16 PROCEDURE — 1159F MED LIST DOCD IN RCRD: CPT | Performed by: PHYSICIAN ASSISTANT

## 2024-04-16 PROCEDURE — 1160F RVW MEDS BY RX/DR IN RCRD: CPT | Performed by: PHYSICIAN ASSISTANT

## 2024-04-16 RX ORDER — ASPIRIN 81 MG/1
81 TABLET ORAL DAILY
COMMUNITY

## 2024-04-16 RX ORDER — SEMAGLUTIDE 1.34 MG/ML
1 INJECTION, SOLUTION SUBCUTANEOUS WEEKLY
COMMUNITY
Start: 2024-04-11

## 2024-04-16 RX ORDER — NITROGLYCERIN 0.4 MG/1
TABLET SUBLINGUAL
Qty: 25 TABLET | Refills: 2 | Status: SHIPPED | OUTPATIENT
Start: 2024-04-16 | End: 2024-04-17 | Stop reason: SDUPTHER

## 2024-04-16 RX ORDER — BUPROPION HYDROCHLORIDE 150 MG/1
150 TABLET ORAL EVERY MORNING
COMMUNITY
Start: 2024-04-11

## 2024-04-16 NOTE — TELEPHONE ENCOUNTER
Pt notified of no acute findings. Provider will discuss results at f/u. Pt reminded of appt date and time.

## 2024-04-16 NOTE — PROGRESS NOTES
Problem list     Subjective   Stephanie Clemens is a 53 y.o. female     Chief Complaint   Patient presents with    Follow-up     Abnormal stress test    Chest Pain    Shortness of Breath    Palpitations       HPI  The patient presents back to review stress and echo findings.  History includes pulmonary embolism at least 2 to 3 years ago.  This was treated appropriately with Xarelto per protocol.  The patient had done well up until recently.  She started having increasing dyspnea and chest tightness.  She eventually was noted to be iron deficient with fairly advanced anemia.  She was treated and initially her symptoms improved.  Again, she was noted to have recurrent anemic issues, and eventually increasing symptoms otherwise.  She was evaluated by her gynecology team and advised that hysterectomy was planned.  We had seen her in the preoperative setting because of ongoing symptoms.  She was scheduled for noninvasive testing.  Stress test was performed and suggested inferolateral and inferoapical ischemia.  Post stress EF was 63%.  Echocardiogram supported normal systolic function with no significant valvular nor structural abnormalities.  She eventually did have a Holter monitor which indicated sinus rhythm, rare PACs and PVCs, and short episodes of SVT.  No significant nor sustained dysrhythmic activity was noted.  The patient still does not feel well.  She has limiting dyspnea.  She then notes chest tightness with increasing levels of exertion.  Symptoms are persistent and concerning for the patient.  She would like to pursue further evaluation.  She denies currently failure nor significant dysrhythmic symptoms.    Current Outpatient Medications on File Prior to Visit   Medication Sig Dispense Refill    aspirin 81 MG EC tablet Take 1 tablet by mouth Daily.      buPROPion XL (WELLBUTRIN XL) 150 MG 24 hr tablet Take 1 tablet by mouth Every Morning.      busPIRone (BUSPAR) 5 MG tablet Take 1 tablet by mouth 3 (Three)  Times a Day.      EPINEPHrine (EPIPEN) 0.3 MG/0.3ML solution auto-injector injection Inject 0.3 mL under the skin into the appropriate area as directed 1 (One) Time.      hydroCHLOROthiazide 12.5 MG tablet Take 1 tablet by mouth Daily.      hydrOXYzine (ATARAX) 25 MG tablet Take 1 tablet by mouth Every 6 (Six) Hours As Needed.      ibuprofen (ADVIL,MOTRIN) 800 MG tablet Take 1 tablet by mouth Every 8 (Eight) Hours As Needed.      levothyroxine (SYNTHROID, LEVOTHROID) 100 MCG tablet Take 1 tablet by mouth Daily.      montelukast (SINGULAIR) 10 MG tablet Take 1 tablet by mouth Every Night.      Ozempic, 1 MG/DOSE, 4 MG/3ML solution pen-injector Inject 1 mg under the skin into the appropriate area as directed 1 (One) Time Per Week.      rosuvastatin (CRESTOR) 5 MG tablet Take 1 tablet by mouth Daily. Patient takes only on mon, wed and fri      sertraline (ZOLOFT) 50 MG tablet Take 1 tablet by mouth Daily.      vitamin D (ERGOCALCIFEROL) 1.25 MG (65627 UT) capsule capsule Take 1 capsule by mouth 1 (One) Time Per Week.      [DISCONTINUED] nitroglycerin (NITROSTAT) 0.4 MG SL tablet 1 under the tongue as needed for angina, may repeat q5mins for up three doses 30 tablet 5     No current facility-administered medications on file prior to visit.       Bee venom and Procaine    Past Medical History:   Diagnosis Date    Anemia, unspecified     Hypothyroidism     Pulmonary embolism     Right thyroid nodule     Sleep apnea        Social History     Socioeconomic History    Marital status:    Tobacco Use    Smoking status: Never    Smokeless tobacco: Never   Substance and Sexual Activity    Alcohol use: Never    Drug use: Never    Sexual activity: Not Currently     Partners: Male     Birth control/protection: Tubal ligation     Comment: Uterine ablasion performed but failed. Hysterectomy  planned       Family History   Problem Relation Age of Onset    No Known Problems Mother     Heart disease Father     Diabetes Father      "Hypertension Sister     Anemia Sister     Breast cancer Maternal Grandmother     Osteoporosis Maternal Grandmother     Hip fracture Maternal Grandmother        Review of Systems   Constitutional:  Positive for fatigue. Negative for chills, diaphoresis and fever.   HENT: Negative.     Eyes: Negative.  Negative for visual disturbance.   Respiratory:  Positive for chest tightness and shortness of breath. Negative for apnea, cough and wheezing.    Cardiovascular:  Positive for chest pain, palpitations and leg swelling.   Gastrointestinal: Negative.  Negative for abdominal pain and blood in stool.   Endocrine: Negative.    Genitourinary: Negative.  Negative for hematuria.   Musculoskeletal:  Positive for arthralgias, back pain, myalgias, neck pain and neck stiffness.   Skin: Negative.  Negative for rash and wound.   Allergic/Immunologic: Positive for environmental allergies (bee's,). Negative for food allergies.   Neurological:  Positive for weakness, light-headedness and numbness. Negative for dizziness, syncope and headaches.   Hematological:  Bruises/bleeds easily (bruises easily is on aspirin).   Psychiatric/Behavioral:  Positive for sleep disturbance.        Objective   Vitals:    04/16/24 1005   BP: 132/73   Pulse: 82   SpO2: 95%   Weight: (!) 143 kg (315 lb)   Height: 175.3 cm (69\")      /73   Pulse 82   Ht 175.3 cm (69\")   Wt (!) 143 kg (315 lb)   SpO2 95%   BMI 46.52 kg/m²    Lab Results (most recent)       None          Physical Exam  Vitals and nursing note reviewed.   Constitutional:       General: She is not in acute distress.     Appearance: She is well-developed.   HENT:      Head: Normocephalic and atraumatic.   Eyes:      Conjunctiva/sclera: Conjunctivae normal.      Pupils: Pupils are equal, round, and reactive to light.   Neck:      Vascular: No JVD.      Trachea: No tracheal deviation.   Cardiovascular:      Rate and Rhythm: Normal rate and regular rhythm.      Heart sounds: Normal heart " sounds.   Pulmonary:      Effort: Pulmonary effort is normal.      Breath sounds: Normal breath sounds.   Abdominal:      General: Bowel sounds are normal. There is no distension.      Palpations: Abdomen is soft. There is no mass.      Tenderness: There is no abdominal tenderness. There is no guarding or rebound.   Musculoskeletal:         General: No tenderness or deformity. Normal range of motion.      Cervical back: Normal range of motion and neck supple.   Skin:     General: Skin is warm and dry.      Coloration: Skin is not pale.      Findings: No erythema or rash.   Neurological:      Mental Status: She is alert and oriented to person, place, and time.   Psychiatric:         Behavior: Behavior normal.         Thought Content: Thought content normal.         Judgment: Judgment normal.           Procedure   Procedures       Assessment & Plan      Diagnosis Plan   1. SOB (shortness of breath)  CT Angiogram Heart With 3D Image      2. Precordial pain  nitroglycerin (NITROSTAT) 0.4 MG SL tablet    CT Angiogram Heart With 3D Image      3. Abnormal nuclear stress test  CT Angiogram Heart With 3D Image        1.  The patient presents for review primarily of stress test findings.  She was seen because of significant symptoms and preop status as above.  She was scheduled for noninvasive evaluation.  Stress test suggested inferolateral and anteroapical ischemia.  I do not feel comfortable clearing the patient for surgery at this time.  I feel she needs further evaluation.  I will schedule for cardiac CTA to further evaluate coronary anatomy.  If abnormal, we will have to consider catheterization at that time.    2.  Echo and event monitor findings were largely benign as above.  We have reviewed that in detail with the patient.    3.  I would continue medical regimen without change.  We did give her refills of nitro to utilize if needed.    4.  This is as we know results of cardiac CTA we can recommend further.  We can  adjust medications further at that time if felt needed.  We will see her in follow-up cardiac CTA and recommended further.                   Electronically signed by:

## 2024-04-16 NOTE — TELEPHONE ENCOUNTER
----- Message from MARCIO Richards sent at 4/16/2024  9:40 AM EDT -----    ----- Message -----  From: Chiara Bernard MA  Sent: 4/15/2024   8:25 AM EDT  To: MARCIO Richards      ----- Message -----  From: Isaac Cunha PA  Sent: 4/14/2024  10:51 PM EDT  To: Chiara Bernard MA    Routine follow-up.  ----- Message -----  From: Arpit Orr MD  Sent: 4/7/2024   6:49 PM EDT  To: AMADA Villareal

## 2024-04-17 RX ORDER — NITROGLYCERIN 0.4 MG/1
TABLET SUBLINGUAL
Qty: 25 TABLET | Refills: 2 | Status: SHIPPED | OUTPATIENT
Start: 2024-04-17

## 2024-04-17 RX ORDER — METOPROLOL TARTRATE 100 MG/1
TABLET ORAL
Qty: 2 TABLET | Refills: 0 | Status: SHIPPED | OUTPATIENT
Start: 2024-04-17

## 2024-04-22 ENCOUNTER — TELEPHONE (OUTPATIENT)
Dept: CARDIOLOGY | Facility: CLINIC | Age: 54
End: 2024-04-22
Payer: MEDICAID

## 2024-04-22 NOTE — TELEPHONE ENCOUNTER
----- Message from AMADA Villareal sent at 4/21/2024 11:12 PM EDT -----  Short episodes of SVT and activity otherwise as noted.  Routine follow-up.  ----- Message -----  From: Arpit Orr MD  Sent: 4/15/2024   9:33 PM EDT  To: AMADA Villareal    Holter Monitor - 72 Hour Up To 15 Days  Order: [id number]  Status: Final result       Visible to patient: Yes (seen)       Dx: Palpitations    0 Result Notes  Details    Reading Physician Reading Date Result Priority   Arpit Orr MD  [phone] 4/15/2024 Routine     Result Text  1.  Sinus rhythm is the baseline predominant rhythm throughout the study.  Maximum heart rate was at 143 bpm, minimum heart rate at 54 bpm, and average heart rate at 71 bpm.  2.  Artifact is noted throughout the study, making some telemetry strips difficult to evaluate.  3.  Rare PACs and PVCs were demonstrated.  4.  Rare atrial couplets were noted.  5.  Rare episodes of atrial trigeminy were demonstrated.  6.  Low voltage is noted throughout the study.  7.  There were 5 episodes of SVT noted.  Fastest rate was at 143 bpm with the longest duration of 4 beats.  8.  A total of 5 triggers were noted during the study.  This occurred during sinus rhythm to sinus tachycardia, but PACs were sensed on 2 episodes.

## 2024-04-29 ENCOUNTER — TELEPHONE (OUTPATIENT)
Dept: CARDIOLOGY | Facility: CLINIC | Age: 54
End: 2024-04-29
Payer: MEDICAID

## 2024-05-06 ENCOUNTER — TELEPHONE (OUTPATIENT)
Dept: CARDIOLOGY | Facility: CLINIC | Age: 54
End: 2024-05-06
Payer: MEDICAID

## 2024-06-26 ENCOUNTER — TELEPHONE (OUTPATIENT)
Dept: CARDIOLOGY | Facility: CLINIC | Age: 54
End: 2024-06-26
Payer: MEDICAID

## 2024-06-26 NOTE — TELEPHONE ENCOUNTER
Working on overdue results, pt is past due for labs as we have not received results or it has not been performed.       Patient will complete in the next day or two

## 2024-07-01 ENCOUNTER — LAB (OUTPATIENT)
Dept: LAB | Facility: HOSPITAL | Age: 54
End: 2024-07-01
Payer: MEDICAID

## 2024-07-01 PROCEDURE — 80048 BASIC METABOLIC PNL TOTAL CA: CPT | Performed by: PHYSICIAN ASSISTANT

## 2024-07-02 ENCOUNTER — TELEPHONE (OUTPATIENT)
Dept: CARDIOLOGY | Facility: CLINIC | Age: 54
End: 2024-07-02
Payer: MEDICAID

## 2024-07-02 NOTE — TELEPHONE ENCOUNTER
----- Message from Isaac Cunha sent at 7/1/2024 11:54 PM EDT -----  Glucose elevated.  Chemistry profile otherwise benign.  ----- Message -----  From: Lab, Background User  Sent: 7/1/2024   7:26 PM EDT  To: AMADA Villareal

## 2024-12-18 ENCOUNTER — OFFICE VISIT (OUTPATIENT)
Dept: CARDIOLOGY | Facility: CLINIC | Age: 54
End: 2024-12-18
Payer: MEDICAID

## 2024-12-18 VITALS
SYSTOLIC BLOOD PRESSURE: 142 MMHG | DIASTOLIC BLOOD PRESSURE: 84 MMHG | HEART RATE: 72 BPM | OXYGEN SATURATION: 97 % | WEIGHT: 293 LBS | BODY MASS INDEX: 43.4 KG/M2 | HEIGHT: 69 IN

## 2024-12-18 DIAGNOSIS — R07.2 PRECORDIAL PAIN: Primary | ICD-10-CM

## 2024-12-18 DIAGNOSIS — R06.02 SOB (SHORTNESS OF BREATH): ICD-10-CM

## 2024-12-18 DIAGNOSIS — R00.2 PALPITATIONS: ICD-10-CM

## 2024-12-18 PROCEDURE — 1159F MED LIST DOCD IN RCRD: CPT | Performed by: PHYSICIAN ASSISTANT

## 2024-12-18 PROCEDURE — 99213 OFFICE O/P EST LOW 20 MIN: CPT | Performed by: PHYSICIAN ASSISTANT

## 2024-12-18 PROCEDURE — 1160F RVW MEDS BY RX/DR IN RCRD: CPT | Performed by: PHYSICIAN ASSISTANT

## 2024-12-18 RX ORDER — ESTRADIOL 1 MG/1
1 TABLET ORAL DAILY
COMMUNITY
Start: 2024-12-05

## 2024-12-18 RX ORDER — PRAMIPEXOLE DIHYDROCHLORIDE 0.12 MG/1
0.25 TABLET ORAL DAILY
COMMUNITY
Start: 2024-11-07 | End: 2025-02-05

## 2024-12-18 RX ORDER — TIRZEPATIDE 2.5 MG/.5ML
INJECTION, SOLUTION SUBCUTANEOUS
COMMUNITY
Start: 2024-11-26

## 2024-12-18 NOTE — PROGRESS NOTES
Problem list     Subjective   Stephanie Clemens is a 54 y.o. female     Chief Complaint   Patient presents with    Follow-up     8 month follow up        HPI  The patient presents in clinic today for follow-up.  History includes apparent PE a number of years ago, treated with Xarelto per protocol.  We started to see her because of recurrent chest pain and dyspnea, noted previously.  She was scheduled for noninvasive testing.  Stress suggested inferolateral and inferoapical ischemia.  Echo indicated normal systolic function no significant valvular nor structural abnormalities.  Monitor eventually was performed which indicated sinus rhythm, rare PACs and PVCs, and short episodes of SVT.  No significant rate or rhythm disturbance issues were noted.  Because of stress test findings and ongoing symptoms, she was scheduled for cardiac CTA, performed in April.  This indicated normal coronaries.    Now the patient does well.  She reports that with appropriate treatment of anxiety and stress, chest pain is resolved.  Palpitations are very infrequent.  Dyspnea is at baseline and mostly unchanged.  She has no further cardiovascular symptoms and now feels that she is doing very well.    Current Outpatient Medications on File Prior to Visit   Medication Sig Dispense Refill    aspirin 81 MG EC tablet Take 1 tablet by mouth Daily.      buPROPion XL (WELLBUTRIN XL) 150 MG 24 hr tablet Take 1 tablet by mouth Every Morning.      EPINEPHrine (EPIPEN) 0.3 MG/0.3ML solution auto-injector injection Inject 0.3 mL under the skin into the appropriate area as directed 1 (One) Time.      estradiol (ESTRACE) 1 MG tablet Take 1 tablet by mouth Daily.      hydroCHLOROthiazide 12.5 MG tablet Take 1 tablet by mouth Daily.      hydrOXYzine (ATARAX) 25 MG tablet Take 1 tablet by mouth Every 6 (Six) Hours As Needed.      ibuprofen (ADVIL,MOTRIN) 800 MG tablet Take 1 tablet by mouth Every 8 (Eight) Hours As Needed.      levothyroxine (SYNTHROID,  LEVOTHROID) 100 MCG tablet Take 1 tablet by mouth Daily.      montelukast (SINGULAIR) 10 MG tablet Take 1 tablet by mouth Every Night.      Mounjaro 2.5 MG/0.5ML solution auto-injector weekly      nitroglycerin (NITROSTAT) 0.4 MG SL tablet 1 under the tongue as needed for angina, may repeat q5mins for up three doses 25 tablet 2    pramipexole (MIRAPEX) 0.125 MG tablet Take 2 tablets by mouth Daily.      sertraline (ZOLOFT) 50 MG tablet Take 1 tablet by mouth Daily.      vitamin D (ERGOCALCIFEROL) 1.25 MG (68900 UT) capsule capsule Take 1 capsule by mouth 1 (One) Time Per Week.      [DISCONTINUED] Ozempic, 1 MG/DOSE, 4 MG/3ML solution pen-injector Inject 1 mg under the skin into the appropriate area as directed 1 (One) Time Per Week.      [DISCONTINUED] busPIRone (BUSPAR) 5 MG tablet Take 1 tablet by mouth 3 (Three) Times a Day.      [DISCONTINUED] metoprolol tartrate (LOPRESSOR) 100 MG tablet Take 1 tab at 6:30 am upon arrival for scan. Do not take second tab, unless told by radiologist 2 tablet 0    [DISCONTINUED] rosuvastatin (CRESTOR) 5 MG tablet Take 1 tablet by mouth Daily. Patient takes only on mon, wed and fri       No current facility-administered medications on file prior to visit.       Bee venom and Procaine    Past Medical History:   Diagnosis Date    Anemia, unspecified     Hypothyroidism     Pulmonary embolism     Right thyroid nodule     Sleep apnea        Social History     Socioeconomic History    Marital status:    Tobacco Use    Smoking status: Never    Smokeless tobacco: Never   Substance and Sexual Activity    Alcohol use: Never    Drug use: Never    Sexual activity: Not Currently     Partners: Male     Birth control/protection: Tubal ligation     Comment: Uterine ablasion performed but failed. Hysterectomy  planned       Family History   Problem Relation Age of Onset    No Known Problems Mother     Heart disease Father     Diabetes Father     Hypertension Sister     Anemia Sister      "Breast cancer Maternal Grandmother     Osteoporosis Maternal Grandmother     Hip fracture Maternal Grandmother        Review of Systems   Constitutional:  Positive for chills and fatigue. Negative for diaphoresis and fever.   Eyes: Negative.  Negative for visual disturbance.   Respiratory: Negative.  Negative for apnea, cough, chest tightness, shortness of breath and wheezing.    Cardiovascular:  Positive for leg swelling. Negative for chest pain and palpitations.   Gastrointestinal: Negative.  Negative for abdominal pain and blood in stool.   Endocrine: Negative.    Genitourinary: Negative.  Negative for hematuria.   Musculoskeletal:  Positive for arthralgias, back pain, myalgias, neck pain and neck stiffness.   Skin: Negative.  Negative for rash and wound.   Allergic/Immunologic: Positive for environmental allergies (bee stings). Negative for food allergies.   Neurological:  Positive for light-headedness and headaches. Negative for dizziness, syncope, weakness and numbness.   Hematological:  Bruises/bleeds easily.   Psychiatric/Behavioral: Negative.  Negative for sleep disturbance.        Objective   Vitals:    12/18/24 1116   BP: 142/84   Pulse: 72   SpO2: 97%   Weight: (!) 147 kg (323 lb)   Height: 175.3 cm (69\")      /84   Pulse 72   Ht 175.3 cm (69\")   Wt (!) 147 kg (323 lb)   SpO2 97%   BMI 47.70 kg/m²    Lab Results (most recent)       None          Physical Exam  Vitals and nursing note reviewed.   Constitutional:       General: She is not in acute distress.     Appearance: She is well-developed. She is obese.   HENT:      Head: Normocephalic and atraumatic.   Eyes:      Conjunctiva/sclera: Conjunctivae normal.      Pupils: Pupils are equal, round, and reactive to light.   Neck:      Vascular: No JVD.      Trachea: No tracheal deviation.   Cardiovascular:      Rate and Rhythm: Normal rate and regular rhythm.   Pulmonary:      Effort: Pulmonary effort is normal.      Breath sounds: Normal breath " sounds.   Abdominal:      General: There is no distension.      Palpations: Abdomen is soft. There is no mass.      Tenderness: There is no abdominal tenderness. There is no guarding or rebound.   Musculoskeletal:         General: No tenderness or deformity. Normal range of motion.      Cervical back: Normal range of motion and neck supple.   Skin:     General: Skin is warm and dry.      Coloration: Skin is not pale.      Findings: No erythema or rash.   Neurological:      Mental Status: She is alert and oriented to person, place, and time.   Psychiatric:         Behavior: Behavior normal.         Thought Content: Thought content normal.         Judgment: Judgment normal.           Procedure   Procedures       Assessment & Plan      Diagnosis Plan   1. Precordial pain        2. SOB (shortness of breath)        3. Palpitations          1.  At this time, the patient appears to be doing well.  Chest pain is resolved with treatment of anxiety.  Dyspnea remains at baseline.  Palpitations have minimized as well.    2.  Workup from cardiac standpoint has been very much benign.  Cardiac CTA supports normal coronaries.  Echo indicates normal structure.  She had no significant rhythm disturbance issues by Holter.    3.  With stable clinical course and benign workup, nothing further.  We will continue to see her on a yearly evaluation.  She will return for any issues.                            Electronically signed by: